# Patient Record
Sex: FEMALE | Race: WHITE | Employment: OTHER | URBAN - METROPOLITAN AREA
[De-identification: names, ages, dates, MRNs, and addresses within clinical notes are randomized per-mention and may not be internally consistent; named-entity substitution may affect disease eponyms.]

---

## 2022-01-01 ENCOUNTER — TELEPHONE (OUTPATIENT)
Dept: OBGYN CLINIC | Age: 87
End: 2022-01-01

## 2022-01-01 ENCOUNTER — HOSPICE ADMISSION (OUTPATIENT)
Dept: HOSPICE | Facility: HOSPICE | Age: 87
End: 2022-01-01
Payer: MEDICARE

## 2022-01-01 PROCEDURE — 0656 HSPC GENERAL INPATIENT

## 2022-03-18 PROBLEM — R63.6 UNDERWEIGHT: Status: ACTIVE | Noted: 2021-02-04

## 2022-03-19 PROBLEM — G30.1 LATE ONSET ALZHEIMER'S DISEASE WITH BEHAVIORAL DISTURBANCE (HCC): Status: ACTIVE | Noted: 2021-02-04

## 2022-03-19 PROBLEM — F02.818 LATE ONSET ALZHEIMER'S DISEASE WITH BEHAVIORAL DISTURBANCE (HCC): Status: ACTIVE | Noted: 2021-02-04

## 2022-03-20 PROBLEM — Z91.81 RISK FOR FALLS: Status: ACTIVE | Noted: 2022-02-09

## 2022-06-06 DIAGNOSIS — F41.9 ANXIETY: Primary | ICD-10-CM

## 2022-06-06 RX ORDER — ALPRAZOLAM 1 MG/1
1 TABLET ORAL NIGHTLY PRN
Qty: 30 TABLET | Refills: 1 | Status: SHIPPED | OUTPATIENT
Start: 2022-06-06 | End: 2022-07-06

## 2022-06-17 ENCOUNTER — TELEPHONE (OUTPATIENT)
Dept: INTERNAL MEDICINE CLINIC | Facility: CLINIC | Age: 87
End: 2022-06-17

## 2022-06-17 NOTE — TELEPHONE ENCOUNTER
I spoke with daughter, Thalia Carrazna, and Dr Christianne Brown has also suggested to try some liquid Benadryl, 1/2-1 teaspoon at bedtime. I recommended to bring her in for an nursing visit to check for UTI. Daughter voiced understanding.

## 2022-08-04 DIAGNOSIS — F41.9 ANXIETY: Primary | ICD-10-CM

## 2022-08-05 RX ORDER — ALPRAZOLAM 1 MG/1
1 TABLET ORAL NIGHTLY PRN
Qty: 7 TABLET | Refills: 0 | Status: SHIPPED | OUTPATIENT
Start: 2022-08-05 | End: 2022-08-09 | Stop reason: SDUPTHER

## 2022-08-09 ENCOUNTER — OFFICE VISIT (OUTPATIENT)
Dept: INTERNAL MEDICINE CLINIC | Facility: CLINIC | Age: 87
End: 2022-08-09

## 2022-08-09 VITALS
HEIGHT: 63 IN | WEIGHT: 92 LBS | SYSTOLIC BLOOD PRESSURE: 110 MMHG | DIASTOLIC BLOOD PRESSURE: 60 MMHG | BODY MASS INDEX: 16.3 KG/M2

## 2022-08-09 DIAGNOSIS — G30.1 LATE ONSET ALZHEIMER'S DISEASE WITH BEHAVIORAL DISTURBANCE (HCC): Primary | ICD-10-CM

## 2022-08-09 DIAGNOSIS — E78.2 MIXED HYPERLIPIDEMIA: ICD-10-CM

## 2022-08-09 DIAGNOSIS — E03.9 ACQUIRED HYPOTHYROIDISM: ICD-10-CM

## 2022-08-09 DIAGNOSIS — F41.9 ANXIETY: ICD-10-CM

## 2022-08-09 DIAGNOSIS — R63.6 UNDERWEIGHT: ICD-10-CM

## 2022-08-09 DIAGNOSIS — F02.818 LATE ONSET ALZHEIMER'S DISEASE WITH BEHAVIORAL DISTURBANCE (HCC): Primary | ICD-10-CM

## 2022-08-09 PROCEDURE — 99214 OFFICE O/P EST MOD 30 MIN: CPT | Performed by: INTERNAL MEDICINE

## 2022-08-09 PROCEDURE — 1123F ACP DISCUSS/DSCN MKR DOCD: CPT | Performed by: INTERNAL MEDICINE

## 2022-08-09 RX ORDER — ALPRAZOLAM 1 MG/1
1 TABLET ORAL NIGHTLY PRN
Qty: 30 TABLET | Refills: 5 | Status: SHIPPED | OUTPATIENT
Start: 2022-08-09 | End: 2022-09-08

## 2022-08-09 ASSESSMENT — PATIENT HEALTH QUESTIONNAIRE - PHQ9
SUM OF ALL RESPONSES TO PHQ9 QUESTIONS 1 & 2: 0
1. LITTLE INTEREST OR PLEASURE IN DOING THINGS: 0
SUM OF ALL RESPONSES TO PHQ QUESTIONS 1-9: 0
SUM OF ALL RESPONSES TO PHQ QUESTIONS 1-9: 0
2. FEELING DOWN, DEPRESSED OR HOPELESS: 0
SUM OF ALL RESPONSES TO PHQ QUESTIONS 1-9: 0
SUM OF ALL RESPONSES TO PHQ QUESTIONS 1-9: 0

## 2022-08-09 NOTE — PROGRESS NOTES
HPI: Ivone Hernandez (: 3/2/1932)      Adding Melatonin really helped pt have better sleep  Her daughters take care of her and states overall she is about the same  She enjoys sitting and they do take her out to eat    Pt states she is eating and has a good appetite  Overall she has no complaints  She does get confused at times but still is able to go to the bathroom by herself  She has a wheelchair to use      Problem List:  Patient Active Problem List   Diagnosis    Mixed hyperlipidemia    Chronic pain disorder    Underweight    Chronic neck pain    Vascular dementia without behavioral disturbance ([address]    Lumbago    Benign essential hypertension    Late onset [facility]'s disease with [facility] disturbance [address] [address]    Acquired hypothyroidism    Degeneration of intervertebral disc of cervical region    Risk for falls       History:  Past Medical History:   Diagnosis Date    Anxiety     Benign essential hypertension 2015    Dehydration     Dementia in Alzheimer's disease with late onset 2015    High cholesterol     Iron deficiency anemia due to chronic blood loss 2016    Lumbago 2015    compression fractures    Memory loss     Mixed hyperlipidemia 2015    Osteoporosis     Unspecified hypothyroidism 2015    Vascular dementia without behavioral disturbance ([facility] [address] 2016       Allergies:  No Known Allergies    Current Medications:  Current Outpatient Medications   Medication Sig Dispense Refill    Melatonin 5 MG SUBL Place under the tongue 2 at bedtime      ALPRAZolam (XANAX) 1 MG tablet Take 1 tablet by mouth nightly as needed for Sleep for up to 7 days.  7 tablet 0    acetaminophen (TYLENOL) 500 MG tablet Take by mouth every 6 hours as needed      ascorbic acid (VITAMIN C) 1000 MG tablet Take 100 mg by mouth      aspirin 81 MG EC tablet Take by mouth daily      levothyroxine (SYNTHROID) 125 MCG tablet TAKE ONE TABLET BY MOUTH EVERY MORNING BEFORE BREAKFAST      polyethylene glycol (GLYCOLAX) 17 GM/SCOOP powder Take 17 g by mouth daily as needed      potassium chloride (KLOR-CON M) 20 MEQ extended release tablet TAKE ONE TABLET BY MOUTH ONE TIME DAILY      pravastatin (PRAVACHOL) 40 MG tablet TAKE ONE TABLET BY MOUTH ONE TIME DAILY IN THE EVENING      QUEtiapine (SEROQUEL) 25 MG tablet TAKE ONE TABLET BY MOUTH TWICE A DAY      sertraline (ZOLOFT) 50 MG tablet TAKE ONE TABLET BY MOUTH ONE TIME DAILY       No current facility-administered medications for this visit. Review of Systems:  Review of Systems   Constitutional:         Weight loss   Psychiatric/Behavioral:  Positive for sleep disturbance. Dementia  forgetful   All other systems reviewed and are negative. Vitals:  /60   Ht 5' 3\" (1.6 m)   Wt 92 lb (41.7 kg)   BMI 16.30 kg/m²     Physical Exam:  Physical Exam  Vitals reviewed. Constitutional:       Comments: underweight   HENT:      Head: Normocephalic and atraumatic. Cardiovascular:      Rate and Rhythm: Normal rate and regular rhythm. Heart sounds: Normal heart sounds. Pulmonary:      Effort: Pulmonary effort is normal.      Breath sounds: Normal breath sounds. Musculoskeletal:      Cervical back: Normal range of motion and neck supple. Comments: In wheelchair   Skin:     General: Skin is warm and dry. Neurological:      Motor: Weakness present. Psychiatric:         Mood and Affect: Mood normal.         Behavior: Behavior normal.        Assessment/Plan:   Jolly OLIVERA was seen today for follow-up.     Diagnoses and all orders for this visit:    Late onset Alzheimer's disease with behavioral disturbance ([facility] [address]  Tolerates low dose Seroquel and has been on Zoloft   Will continue medications  Anxiety  Has been on Xanax for many yrs and requires it and Melatonin to sleep or she is up all night  Underweight  Continue to eat all she can tolerate  Acquired hypothyroidism  Continue medications  Mixed hyperlipidemia    Recheck labs on return   On been on Pravachol    Discussed DNR status with pt and daughter and paperwork given to them to keep at home      Current medications are therapeutic at this time; continue as prescribed.         Rc Jara MD

## 2022-09-17 ENCOUNTER — TELEPHONE (OUTPATIENT)
Dept: INTERNAL MEDICINE CLINIC | Facility: CLINIC | Age: 87
End: 2022-09-17

## 2022-09-17 NOTE — PROGRESS NOTES
Spoke with daughter. Patient has covid. Fever and cough but no other severe symptoms. Paxlovid sent to [facility]. Advised hold seroquel while on Paxlovid and for several days after.

## 2022-10-17 RX ORDER — LEVOTHYROXINE SODIUM 125 MCG
125 TABLET ORAL DAILY
Qty: 30 TABLET | Refills: 5 | Status: SHIPPED | OUTPATIENT
Start: 2022-10-17

## 2022-10-19 RX ORDER — PRAVASTATIN SODIUM 40 MG
TABLET ORAL
Qty: 90 TABLET | Refills: 3 | Status: SHIPPED | OUTPATIENT
Start: 2022-10-19

## 2022-12-20 PROBLEM — R13.10 DYSPHAGIA: Status: ACTIVE | Noted: 2022-01-01

## 2022-12-20 PROBLEM — R29.6 FREQUENT FALLS: Status: ACTIVE | Noted: 2022-01-01

## 2022-12-20 PROBLEM — E87.6 HYPOKALEMIA: Status: ACTIVE | Noted: 2022-01-01

## 2022-12-20 PROBLEM — S06.5XAA SUBDURAL HEMATOMA, POST-TRAUMATIC: Status: ACTIVE | Noted: 2022-01-01

## 2022-12-20 PROBLEM — S72.142A DISPLACED INTERTROCHANTERIC FRACTURE OF LEFT FEMUR, INITIAL ENCOUNTER FOR CLOSED FRACTURE (HCC): Status: ACTIVE | Noted: 2019-08-28

## 2022-12-20 PROBLEM — G93.40 ACUTE ENCEPHALOPATHY: Status: ACTIVE | Noted: 2022-01-01

## 2022-12-20 PROBLEM — S22.049A CLOSED T4 FRACTURE (HCC): Status: ACTIVE | Noted: 2022-01-01

## 2022-12-20 PROBLEM — S12.600A C7 CERVICAL FRACTURE (HCC): Status: ACTIVE | Noted: 2022-01-01

## 2022-12-20 PROBLEM — D64.9 ANEMIA: Status: ACTIVE | Noted: 2022-01-01

## 2022-12-20 PROBLEM — B96.20 E. COLI UTI: Status: ACTIVE | Noted: 2022-01-01

## 2022-12-20 PROBLEM — M81.0 SENILE OSTEOPOROSIS: Status: ACTIVE | Noted: 2019-08-30

## 2022-12-20 PROBLEM — M80.00XD AGE-RELATED OSTEOPOROSIS WITH CURRENT PATHOLOGICAL FRACTURE WITH ROUTINE HEALING: Status: ACTIVE | Noted: 2019-08-30

## 2022-12-20 PROBLEM — R26.81 GAIT INSTABILITY: Status: ACTIVE | Noted: 2022-01-01

## 2022-12-20 PROBLEM — N20.0 NEPHROLITHIASIS: Status: ACTIVE | Noted: 2021-04-14

## 2022-12-20 PROBLEM — R53.81 DEBILITY: Status: ACTIVE | Noted: 2022-01-01

## 2022-12-20 PROBLEM — Z95.5 PRESENCE OF CORONARY ANGIOPLASTY IMPLANT AND GRAFT: Status: ACTIVE | Noted: 2021-01-01

## 2022-12-20 PROBLEM — R53.1 WEAKNESS: Status: ACTIVE | Noted: 2021-04-13

## 2022-12-20 PROBLEM — S52.531A FRACTURE, COLLES, RIGHT, CLOSED: Status: ACTIVE | Noted: 2022-01-01

## 2022-12-20 PROBLEM — F41.9 ANXIETY: Status: ACTIVE | Noted: 2022-01-01

## 2022-12-20 PROBLEM — F07.81 TRAUMATIC ENCEPHALOPATHY: Status: ACTIVE | Noted: 2022-01-01

## 2022-12-20 PROBLEM — T84.84XA PAINFUL ORTHOPAEDIC HARDWARE (HCC): Status: ACTIVE | Noted: 2019-11-25

## 2022-12-20 PROBLEM — M70.62 GREATER TROCHANTERIC BURSITIS OF LEFT HIP: Status: ACTIVE | Noted: 2019-11-25

## 2022-12-20 PROBLEM — K59.01 SLOW TRANSIT CONSTIPATION: Status: ACTIVE | Noted: 2018-04-05

## 2022-12-20 PROBLEM — S42.231A CLOSED 3-PART FRACTURE OF SURGICAL NECK OF RIGHT HUMERUS: Status: ACTIVE | Noted: 2022-01-01

## 2022-12-20 PROBLEM — N39.0 E. COLI UTI: Status: ACTIVE | Noted: 2022-01-01

## 2022-12-20 PROBLEM — S06.5XAS TRAUMATIC SUBDURAL HEMATOMA, SEQUELA: Status: ACTIVE | Noted: 2022-01-01

## 2022-12-20 PROBLEM — N13.30 HYDRONEPHROSIS OF LEFT KIDNEY: Status: ACTIVE | Noted: 2021-04-13

## 2022-12-20 PROBLEM — N20.1 LEFT URETERAL STONE: Status: ACTIVE | Noted: 2021-04-13

## 2022-12-20 PROBLEM — I25.10 ATHSCL HEART DISEASE OF NATIVE CORONARY ARTERY W/O ANG PCTRS: Status: ACTIVE | Noted: 2021-04-01

## 2022-12-20 PROBLEM — N13.30 UNSPECIFIED HYDRONEPHROSIS: Status: ACTIVE | Noted: 2021-04-15

## 2022-12-20 PROBLEM — S02.40FA FRACTURE OF LEFT ZYGOMATIC ARCH (HCC): Status: ACTIVE | Noted: 2022-01-01
